# Patient Record
Sex: FEMALE | Race: ASIAN | URBAN - METROPOLITAN AREA
[De-identification: names, ages, dates, MRNs, and addresses within clinical notes are randomized per-mention and may not be internally consistent; named-entity substitution may affect disease eponyms.]

---

## 2021-12-15 ENCOUNTER — WEB ENCOUNTER (OUTPATIENT)
Dept: URBAN - METROPOLITAN AREA CLINIC 74 | Facility: CLINIC | Age: 31
End: 2021-12-15

## 2021-12-15 ENCOUNTER — OFFICE VISIT (OUTPATIENT)
Dept: URBAN - METROPOLITAN AREA CLINIC 74 | Facility: CLINIC | Age: 31
End: 2021-12-15
Payer: COMMERCIAL

## 2021-12-15 ENCOUNTER — DASHBOARD ENCOUNTERS (OUTPATIENT)
Age: 31
End: 2021-12-15

## 2021-12-15 VITALS
TEMPERATURE: 98.2 F | HEIGHT: 63 IN | DIASTOLIC BLOOD PRESSURE: 60 MMHG | BODY MASS INDEX: 18.61 KG/M2 | SYSTOLIC BLOOD PRESSURE: 98 MMHG | WEIGHT: 105 LBS | HEART RATE: 95 BPM | OXYGEN SATURATION: 99 %

## 2021-12-15 DIAGNOSIS — K60.2 ANAL FISSURE: ICD-10-CM

## 2021-12-15 DIAGNOSIS — K64.8 INTERNAL HEMORRHOID: ICD-10-CM

## 2021-12-15 PROBLEM — 90458007: Status: ACTIVE | Noted: 2021-12-15

## 2021-12-15 PROCEDURE — 99203 OFFICE O/P NEW LOW 30 MIN: CPT | Performed by: STUDENT IN AN ORGANIZED HEALTH CARE EDUCATION/TRAINING PROGRAM

## 2021-12-15 RX ORDER — DOCUSATE SODIUM 100 MG/1
1 CAPSULE AS NEEDED CAPSULE, LIQUID FILLED ORAL ONCE A DAY
Status: ACTIVE | COMMUNITY

## 2021-12-15 RX ORDER — DEXTROSE 4 G
1 TABLET TABLET,CHEWABLE ORAL ONCE A DAY
Status: ACTIVE | COMMUNITY

## 2021-12-15 NOTE — HPI-TODAY'S VISIT:
31-year-old female New to me Comes in for evaluation of anal pain. Patient reports past history of internal hemorrhoids and anal fissure.  This was about 8 years back.  Patient had a colonoscopy at that time which was negative for any alternative etiology.  Patient was treated with topical nifedipine cream and sitz bath with good relief of her symptoms.  She did well in between but recently has been having recurrent issues with anal pain.  Denies any bleeding in the stools.  Bowel movements seems to be regular and soft in consistency.  No nausea or vomiting.  No abdomen pain.  No change in appetite.  No unintentional weight loss. Has been using over-the-counter topical hemorrhoidal cream with not much relief in her symptoms.  Has been using sitz bath which do help but only transiently.